# Patient Record
(demographics unavailable — no encounter records)

---

## 2025-01-25 NOTE — HISTORY OF PRESENT ILLNESS
[de-identified] : Fever, fatigue, congestion. Tylenol given at 2pm.  George #[id number] [FreeTextEntry6] :  + congestion and cough, no ST, no ear pain, + n/v X 1 no c/d, eating and drinking well, normal voiding. + fever X1day

## 2025-01-25 NOTE — DISCUSSION/SUMMARY
[FreeTextEntry1] : D/W caregiver pt is positive for influenza. Reviewed etiology of influenza and usual disease course; reviewed supportive care including antipyretics, fluids and nasal saline; advise monitor for worsening cough, persistent fever and dehydration- call for f/u if occurring; reviewed risk/benefits of Tamiflu use and indications- parents would like Tamiflu today. Rapid covid negative.  time spent: 30min